# Patient Record
(demographics unavailable — no encounter records)

---

## 2025-05-15 NOTE — DEVELOPMENTAL MILESTONES
[See scanned document for history] : See scanned document for history [Walk ___ Months] : Walk: [unfilled] months [Not Yet Determined] : not yet determined

## 2025-05-16 NOTE — DATA REVIEWED
[de-identified] : My review and interpretation of the radiologic studies: [facility] R tibia XR 5/14/25 report indicates no acute fracture. Upon independent review, there appears to be an irregularity at the medial distal metaphyseal cortex with an adjacent obliquely oriendted lucency consistent with suspected non displaced fracture of the distal tibia.

## 2025-05-16 NOTE — ASSESSMENT
[FreeTextEntry1] : 2yF with suspected right non displaced distal tibia fracture 5/14/25  The history was obtained today from the child and parent; given the patient's age and/or the child's mental capacity, the history was unreliable and the parent was used as an independent historian.   Hailee is overall doing well though she has some swelling and mild discomfort with palpation to her right lower leg.  Radiographs taken yesterday at outside institution were reviewed in office today and demonstrate suspected nondisplaced distal tibia fracture.  These results were discussed with mother and we discussed treatment options in office.  I explained that we can place her in a short leg cast versus a cam boot for immobilization and comfort purposes.  I explained my concerns with casting at such a young age as the potential for skin issue such as heel pressure sore.  With the cam boot, there is adequate protection in the ability to remove the boot to check the skin and bathe the child.  I explained that she will not bear weight initially but as she begins to heal this and begins to be more comfortable, she may begin to take steps.  This is a sign that the bone is healing.  She can bear weight as tolerated.  No gym, sports, playgrounds or bouncy houses at this time.  We will plan to see her back in 3 weeks to repeat x-rays of the right tibia to look for healing response and monitor her clinical progress. This plan was discussed with family and all questions and concerns were addressed today.  FU 3 weeks XR right tibia out of boot  Mayda GILLETTE PA-C, have acted as a scribe and documented the above for Dr. Barton  The above documentation completed by the scribe is an accurate record of both my words and actions.

## 2025-05-16 NOTE — HISTORY OF PRESENT ILLNESS
[FreeTextEntry1] : Layne is an otherwise healthy 2-year-old female who is brought in today by her mother for pediatric orthopedic evaluation of her right lower extremity.  Mom reports that yesterday at 8 AM, she slipped on water on the kitchen tile.  She fell with her right leg underneath her.  She cried and has since not been able to bear weight to the right lower extremity.  She was taken to go [facility] urgent care where x-rays were performed but no fracture was noted.  Mom states the child is very fearful when she goes to move her leg.  She thinks the injury happened to her lower leg.  She has been getting Motrin around-the-clock every 6 hours.  She typically is an ambulator and started walking at 12 months of age.  She is here today for pediatric orthopedic evaluation and management.

## 2025-05-16 NOTE — REVIEW OF SYSTEMS
[Change in Activity] : change in activity [Joint Pains] : arthralgias [NI] : Endocrine [Nl] : Hematologic/Lymphatic [Fever Above 102] : no fever [Malaise] : no malaise [Rash] : no rash [Murmur] : no murmur [Cough] : no cough

## 2025-05-16 NOTE — DATA REVIEWED
[de-identified] : My review and interpretation of the radiologic studies: [facility] R tibia XR 5/14/25 report indicates no acute fracture. Upon independent review, there appears to be an irregularity at the medial distal metaphyseal cortex with an adjacent obliquely oriendted lucency consistent with suspected non displaced fracture of the distal tibia.

## 2025-05-16 NOTE — HISTORY OF PRESENT ILLNESS
[FreeTextEntry1] : Layne is an otherwise healthy 2-year-old female who is brought in today by her mother for pediatric orthopedic evaluation of her right lower extremity.  Mom reports that yesterday at 8 AM, she slipped on water on the kitchen tile.  She fell with her right leg underneath her.  She cried and has since not been able to bear weight to the right lower extremity.  She was taken to go [facility] urgent care where x-rays were performed but no fracture was noted.  Mom states the child is very fearful when she goes to move her leg.  She thinks the injury happened to her lower leg.  She has been getting Motrin around-the-clock every 6 hours.  She typically is an ambulator and started walking at 12 months of age.  She is here today for pediatric orthopedic evaluation and management.

## 2025-05-16 NOTE — PHYSICAL EXAM
[FreeTextEntry1] : Healthy appearing 2 year-old child. Awake, alert, in no acute distress. Pleasant and cooperative.  Eyes are clear with no sclera abnormalities. External ears, nose and mouth are clear.  Good respiratory effort with no audible wheezing without use of a stethoscope. Ambulates independently with no evidence of antalgia. Good coordination and balance. Able to get on and off exam table without difficulty.   Lower Extremities: Skin is clean and intact. Good overall alignment of lower extremities. Swelling noted to right lower leg, no deformity No erythema, or ecchymosis. No lymphedema. She slightly grimaces with palpation to the right distal tibia, no apparent tenderness to right femur, patella, proximal tibia, 5 metatarsals. Full ROM bilateral knees/ankles. SILT, 5/5 strength EHL/FHL/ TA/GS DP 2+, Brisk cap refill <2 seconds

## 2025-05-16 NOTE — BIRTH HISTORY
[Non-Contributory] : Non-contributory [Vaginal] : Vaginal [___ lbs.] : [unfilled] lbs [Was child in NICU?] : Child was not in NICU

## 2025-05-28 NOTE — ASSESSMENT
[Use of independent historian: [ enter independent historian's relationship to patient ] :____] : As the patient was unable to provide a complete and reliable history, I obtained clinical history from the patient's [unfilled] [FreeTextEntry1] : Molluscum contagiosum - I have discussed the nature and usual course, and I have explained the etiology and potential for spreading. Reviewed that lesions may last several months to 2 years.  - Counseled on r/b/a of various treatment options including active nonintervention (observation), cantharidin, tretinoin, and cryotherapy. Counseled that they may become inflamed before they resolve.  -Counseled that as they are a pox virus they may leave a small pinpoint scar.  -Counseled the patient of the "beginning of the end sign" involving the inflammatory reaction of the body reacting to the lesions - advised patient to avoid scratching  - after obtaining verbal consent, I applied cantheridin today to 6 lesions total on the L arm and L flank. pt tolerated well. SED including blistering, irritation, discoloration, scar. parent understands to wash off the areas well with soap and water in 2-3 hours.  Molluscum dermatitis and xerosis dry skin care reviewed, handout provided. Switch to recommended products in handout and moisturize liberally. ok to use TAC sparingly just to the rough patches PRN roughness Reviewed SE of topical steroids including skin thinning and discoloration and discussed avoidance of prolonged use.

## 2025-05-28 NOTE — PHYSICAL EXAM
[FreeTextEntry3] : multiple eczematous patches on trunk numerous delled papules on L arm and L flank

## 2025-05-28 NOTE — HISTORY OF PRESENT ILLNESS
[FreeTextEntry1] : np rash [de-identified] : Ms. KATARZYNA LUNA  is a 2 year old F here for evaluation of below  #h/o eczema previously treated with eucerin, then developed MC in april. recently had severe flare of eczema - healed with triamcinolone 0.1% ointment started 3 days ago but in that time the molluscum spread rapidly

## 2025-06-10 NOTE — ASSESSMENT
[FreeTextEntry1] : 2yF with well healed right non displaced distal tibia fracture 5/14/25  The history was obtained today from the child and parent; given the patient's age and/or the child's mental capacity, the history was unreliable and the parent was used as an independent historian.   Layne is overall doing well. She has been recovering well from her injury and is walking well. She has a subtle limp when walking but has no pain. Radiographs taken today confirm periosteal reaction of the tibia and fibula indicating non displaced fracture. I expect her limp will continue to improve over time with daily activity. No further treatment or observation is needed. Family may return to our office on as needed basis if any further concerns or issues.  This plan was discussed with family and all questions and concerns were addressed today.  I, Mayda Crump PA-C, have acted as a scribe and documented the above for Dr. Barton  The above documentation completed by the scribe is an accurate record of both my words and actions.

## 2025-06-10 NOTE — DATA REVIEWED
[de-identified] : My review and interpretation of the radiologic studies: 6/10/25 R tibia XR demonstrates periosteal reaction along tibial shaft and fibula, indicating well healing non displaced fracture of the tibia and fibula.   [facility] R tibia XR 5/14/25 report indicates no acute fracture. Upon independent review, there appears to be an irregularity at the medial distal metaphyseal cortex with an adjacent obliquely oriendted lucency consistent with suspected non displaced fracture of the distal tibia.

## 2025-06-10 NOTE — PHYSICAL EXAM
[FreeTextEntry1] : Healthy appearing 2 year-old child. Awake, alert, in no acute distress. Pleasant and cooperative.  Eyes are clear with no sclera abnormalities. External ears, nose and mouth are clear.  Good respiratory effort with no audible wheezing without use of a stethoscope. Ambulates independently with a mild limp, no evidence of antalgia. Good coordination and balance.   Lower Extremities: Skin is clean and intact. Good overall alignment of lower extremities. No swelling noted to right lower leg, no deformity No erythema, or ecchymosis. No lymphedema. NTTP over femur, tibia and foot. Full ROM bilateral knees/ankles. SILT, 5/5 strength EHL/FHL/ TA/GS DP 2+, Brisk cap refill <2 seconds

## 2025-06-10 NOTE — HISTORY OF PRESENT ILLNESS
[FreeTextEntry1] : Layne is an otherwise healthy 2-year-old female who is brought in today by her mother for pediatric orthopedic evaluation of her right lower extremity for follow up of suspected toddler's fracture 5/14/25   Per prior report, the child had slipped on water on the kitchen tile.  She fell with her right leg underneath her.  She cried and has since not been able to bear weight to the right lower extremity.  She was taken to go [facility] urgent care where x-rays were performed but no fracture was noted.  Mom stated the child is very fearful when she goes to move her leg.  We saw her on 5/15/25 and suspected a toddler's fracture. Cam boot was recommended. Please see prior note for further details.   Mother states child did not really use the boot. Instead, she crawled initially. She then progressed to ambulating with a limp. She seems to be doing well and is walking. Her gait is still slightly off but mom is not concerned. Overall no concerns today. She is here today for pediatric orthopedic evaluation and management.